# Patient Record
(demographics unavailable — no encounter records)

---

## 2025-04-11 NOTE — HISTORY OF PRESENT ILLNESS
[FreeTextEntry1] : hjx colon polyps muktple cololnsoceis  otuside recrdso reviwed   on plavix for pplaque only no stents  spoke tiwht mikayla in detail  check colonpscy with miralxa prep  to hold metofomrnmin and plavix per protocol

## 2025-04-14 NOTE — PHYSICAL EXAM
[Alert] : alert [Well Nourished] : well nourished [No Acute Distress] : no acute distress [Well Developed] : well developed [Normal Pupil & Iris Size/Symmetry] : normal pupil and iris size and symmetry [Normal TMs] : both tympanic membranes were normal [Boggy Nasal Turbinates] : no boggy and/or pale nasal turbinates [Pharyngeal erythema] : no pharyngeal erythema [Posterior Pharyngeal Cobblestoning] : no posterior pharyngeal cobblestoning [Clear Rhinorrhea] : no clear rhinorrhea was seen [Exudate] : no exudate [Normal Rate and Effort] : normal respiratory rhythm and effort [No Crackles] : no crackles [Bilateral Audible Breath Sounds] : bilateral audible breath sounds [Wheezing] : no wheezing was heard [Normal Rate] : heart rate was normal  [Normal S1, S2] : normal S1 and S2 [No murmur] : no murmur [Regular Rhythm] : with a regular rhythm [Skin Intact] : skin intact  [No Rash] : no rash [Patches] : no patches [Urticaria] : no urticaria [Normal Mood] : mood was normal

## 2025-04-14 NOTE — HISTORY OF PRESENT ILLNESS
[de-identified] : 72 year old male with hypertension, valvular heart disease, hypercholesterolemia, presents for follow up of his asthma  Asthma: Currently well controlled with Advair 115 two puff BID. Denies any coughing, shortness of breath or wheezing. He is active walks 2 miles 3 times a week, no exertional symptoms. ACT 21  Allergic Rhinitis sensitivity to dust mite.  Denies any nasal symptoms. Admits to intermittent ocular pruritus'. Not taking any oral antihistamine or nasal steroids.   No history or symptoms of eczematous rashes, food allergies.

## 2025-04-14 NOTE — HISTORY OF PRESENT ILLNESS
[de-identified] : 72 year old male with hypertension, valvular heart disease, hypercholesterolemia, presents for follow up of his asthma  Asthma: Currently well controlled with Advair 115 two puff BID. Denies any coughing, shortness of breath or wheezing. He is active walks 2 miles 3 times a week, no exertional symptoms. ACT 21  Allergic Rhinitis sensitivity to dust mite.  Denies any nasal symptoms. Admits to intermittent ocular pruritus'. Not taking any oral antihistamine or nasal steroids.   No history or symptoms of eczematous rashes, food allergies.

## 2025-04-14 NOTE — REVIEW OF SYSTEMS
[Fatigue] : no fatigue [Fever] : no fever [Eye Discharge] : no eye discharge [Eye Redness] : no redness [Puffy Eyelids] : no puffy ~T eyelids [Bloodshot Eyes] : no bloodshot ~T eyes [Rhinorrhea] : no rhinorrhea [Nasal Congestion] : no nasal congestion [Snoring] : no snoring [Post Nasal Drip] : no post nasal drip [Sneezing] : no sneezing [Cough] : no cough [Wheezing Worsens With Exercise] : wheezing does not worsen with exercise [Wheezing] : no wheezing [Nl] : Integumentary